# Patient Record
Sex: FEMALE
[De-identification: names, ages, dates, MRNs, and addresses within clinical notes are randomized per-mention and may not be internally consistent; named-entity substitution may affect disease eponyms.]

---

## 2024-07-17 ENCOUNTER — NON-APPOINTMENT (OUTPATIENT)
Age: 74
End: 2024-07-17

## 2024-07-24 ENCOUNTER — APPOINTMENT (OUTPATIENT)
Dept: ORTHOPEDIC SURGERY | Facility: CLINIC | Age: 74
End: 2024-07-24

## 2024-07-24 VITALS — BODY MASS INDEX: 20.76 KG/M2 | WEIGHT: 103 LBS | HEIGHT: 59 IN

## 2024-07-24 DIAGNOSIS — S62.109A FRACTURE OF UNSPECIFIED CARPAL BONE, UNSPECIFIED WRIST, INITIAL ENCOUNTER FOR CLOSED FRACTURE: ICD-10-CM

## 2024-07-24 DIAGNOSIS — Z87.891 PERSONAL HISTORY OF NICOTINE DEPENDENCE: ICD-10-CM

## 2024-07-24 DIAGNOSIS — Z78.9 OTHER SPECIFIED HEALTH STATUS: ICD-10-CM

## 2024-07-24 PROCEDURE — 99204 OFFICE O/P NEW MOD 45 MIN: CPT | Mod: 25

## 2024-07-24 PROCEDURE — 73110 X-RAY EXAM OF WRIST: CPT | Mod: LT

## 2024-07-24 PROCEDURE — 29125 APPL SHORT ARM SPLINT STATIC: CPT | Mod: LT

## 2024-07-24 RX ORDER — ALPRAZOLAM 2 MG/1
TABLET ORAL
Refills: 0 | Status: ACTIVE | COMMUNITY

## 2024-07-25 NOTE — HISTORY OF PRESENT ILLNESS
[de-identified] : Age: 73 year F PMHx: none Hand Dominance: RHD Chief Complaint: Patient presents for LT wrist pain evaluation with an injury on 7/18/24. Patient states she had a fall playing pickleball. Patient went to  urgent care right after and was told it is fractured and was splinted. Patient denies numbess/tingling. Patinet states she doesn't feel pain with the splint on. Trauma: yes,  7/18/24 Outside Imaging/Treatment: x-ray at  urgent care OTC Medications: Motrin, aspirin OT/PT: none Bracing: splint Pain worse with: Pain better with: ice

## 2024-07-25 NOTE — HISTORY OF PRESENT ILLNESS
[de-identified] : Age: 73 year F PMHx: none Hand Dominance: RHD Chief Complaint: Patient presents for LT wrist pain evaluation with an injury on 7/18/24. Patient states she had a fall playing pickleball. Patient went to  urgent care right after and was told it is fractured and was splinted. Patient denies numbess/tingling. Patinet states she doesn't feel pain with the splint on. Trauma: yes,  7/18/24 Outside Imaging/Treatment: x-ray at  urgent care OTC Medications: Motrin, aspirin OT/PT: none Bracing: splint Pain worse with: Pain better with: ice

## 2024-07-25 NOTE — ASSESSMENT
[FreeTextEntry1] : EXAM Left wrist with mild swelling, skin intact, no ecchymosis. +ttp at radial metaphysis, +ttp at radial styloid, Listers, or ulnar styloid. Able to make fist, oppose thumb to small finger and abduct fingers. Sensation intact throughout. <2sec cap refill.  Left wrist radiographs with distal radius fracture, minimally displaced, radial styloid - alignment maintained from radiographs from outside facility  ASSESSMENT/PLAN Left distal radius fracture, minimally displaced - will manage with closed management  Reviewed radiographs with patient and discussed pathoanatomy. Discussed alignment is within acceptable parameters to manage with closed management in brace. Discussed risk of stiffness, late tendon injury, and displacement requiring operative intervention. NWB, elevate, NSAIDs prn. Discussed the 10-15 degree of dorsal tilt and the accompanying wrist deformity - came to mutual decision to proceed with nonoperative management.  Procedure - left short arm plaster splint, reverse sugar tong applied, patient tolerated well.  F/u 2weeks; reassess, repeat films.

## 2024-08-09 ENCOUNTER — APPOINTMENT (OUTPATIENT)
Dept: ORTHOPEDIC SURGERY | Facility: CLINIC | Age: 74
End: 2024-08-09

## 2024-08-09 PROCEDURE — 73110 X-RAY EXAM OF WRIST: CPT | Mod: LT

## 2024-08-09 PROCEDURE — 99213 OFFICE O/P EST LOW 20 MIN: CPT

## 2024-08-09 NOTE — HISTORY OF PRESENT ILLNESS
[de-identified] : Age: 73 year F PMHx: none Hand Dominance: RHD Chief Complaint: Patient presents for LT wrist pain evaluation with an injury on 7/18/24. Patient states she had a fall playing pickleball. Patient went to  urgent care right after and was told it is fractured and was splinted. Patient denies numbess/tingling. Patinet states she doesn't feel pain with the splint on. Trauma: yes,  7/18/24 Outside Imaging/Treatment: x-ray at  urgent care OTC Medications: Motrin, aspirin OT/PT: none Bracing: splint Pain worse with: Pain better with: ice  08/09/24: f/u left wrist. Patient reports that she is doing well, reporting that she is able to lift her arms without any issues. Patient presents in short arm plaster splint and reports being compliant with keeping it dry and clean. Denies numbness/tingling.

## 2024-08-09 NOTE — ASSESSMENT
[FreeTextEntry1] : EXAM Left wrist with mild swelling, skin intact, no ecchymosis. +ttp at radial metaphysis, +ttp at radial styloid, Listers, or ulnar styloid. Able to make fist, oppose thumb to small finger and abduct fingers. Sensation intact throughout. <2sec cap refill.  Left wrist radiographs with distal radius fracture, minimally displaced, radial styloid - alignment maintained from radiographs from outside facility  ASSESSMENT/PLAN Left distal radius fracture, minimally displaced - will manage with closed management  Reviewed radiographs with patient and discussed pathoanatomy. Discussed alignment is within acceptable parameters to manage with closed management in brace. Discussed risk of stiffness, late tendon injury, and displacement requiring operative intervention. NWB, elevate, NSAIDs prn. Discussed the 10-15 degree of dorsal tilt and the accompanying wrist deformity - came to mutual decision to proceed with nonoperative management.  Procedure - left short arm plaster splint, reverse sugar tong applied, patient tolerated well.  F/u 2weeks; reassess, repeat films. convert to brace

## 2024-08-20 ENCOUNTER — APPOINTMENT (OUTPATIENT)
Dept: ORTHOPEDIC SURGERY | Facility: CLINIC | Age: 74
End: 2024-08-20
Payer: MEDICARE

## 2024-08-20 DIAGNOSIS — S62.109A FRACTURE OF UNSPECIFIED CARPAL BONE, UNSPECIFIED WRIST, INITIAL ENCOUNTER FOR CLOSED FRACTURE: ICD-10-CM

## 2024-08-20 PROCEDURE — 99213 OFFICE O/P EST LOW 20 MIN: CPT

## 2024-08-20 PROCEDURE — 73110 X-RAY EXAM OF WRIST: CPT | Mod: LT

## 2024-08-21 NOTE — HISTORY OF PRESENT ILLNESS
[de-identified] : Age: 73 year F PMHx: none Hand Dominance: RHD Chief Complaint: Patient presents for LT wrist pain evaluation with an injury on 7/18/24. Patient states she had a fall playing pickleball. Patient went to  urgent care right after and was told it is fractured and was splinted. Patient denies numbess/tingling. Patinet states she doesn't feel pain with the splint on. Trauma: yes,  7/18/24 Outside Imaging/Treatment: x-ray at  urgent care OTC Medications: Motrin, aspirin OT/PT: none Bracing: splint Pain worse with: Pain better with: ice  08/09/24: f/u left wrist. Patient reports that she is doing well, reporting that she is able to lift her arms without any issues. Patient presents in short arm plaster splint and reports being compliant with keeping it dry and clean. Denies numbness/tingling.   8/20/24: f/u left wrist, patient reports experiencing minimal intermittent pain, doing well overall.

## 2024-08-21 NOTE — HISTORY OF PRESENT ILLNESS
[de-identified] : Age: 73 year F PMHx: none Hand Dominance: RHD Chief Complaint: Patient presents for LT wrist pain evaluation with an injury on 7/18/24. Patient states she had a fall playing pickleball. Patient went to  urgent care right after and was told it is fractured and was splinted. Patient denies numbess/tingling. Patinet states she doesn't feel pain with the splint on. Trauma: yes,  7/18/24 Outside Imaging/Treatment: x-ray at  urgent care OTC Medications: Motrin, aspirin OT/PT: none Bracing: splint Pain worse with: Pain better with: ice  08/09/24: f/u left wrist. Patient reports that she is doing well, reporting that she is able to lift her arms without any issues. Patient presents in short arm plaster splint and reports being compliant with keeping it dry and clean. Denies numbness/tingling.   8/20/24: f/u left wrist, patient reports experiencing minimal intermittent pain, doing well overall.

## 2024-08-21 NOTE — ASSESSMENT
[FreeTextEntry1] : EXAM Left wrist with mild swelling, skin intact, no ecchymosis. Mild ttp at radial metaphysis, Mild ttp at radial styloid, Listers, or ulnar styloid. Able to make fist, oppose thumb to small finger and abduct fingers. Sensation intact throughout. <2sec cap refill.  Left wrist radiographs with distal radius fracture, minimally displaced, radial styloid, +callus, alignment maintained.  ASSESSMENT/PLAN Left distal radius fracture, minimally displaced - will manage with closed management  Reviewed radiographs with patient and discussed pathoanatomy. Discussed alignment is within acceptable parameters to manage with closed management in brace. Discussed risk of stiffness, late tendon injury, and displacement requiring operative intervention. NWB, elevate, NSAIDs prn. Discussed the 10-15 degree of dorsal tilt and the accompanying wrist deformity - came to mutual decision to proceed with nonoperative management.  Ease out of brqace and into use. OT for ROM  F/u 4weeks; reassess, repeat films

## 2024-09-27 ENCOUNTER — APPOINTMENT (OUTPATIENT)
Dept: ORTHOPEDIC SURGERY | Facility: CLINIC | Age: 74
End: 2024-09-27
Payer: MEDICARE

## 2024-09-27 VITALS — WEIGHT: 103 LBS | BODY MASS INDEX: 20.76 KG/M2 | HEIGHT: 59 IN

## 2024-09-27 DIAGNOSIS — S62.109A FRACTURE OF UNSPECIFIED CARPAL BONE, UNSPECIFIED WRIST, INITIAL ENCOUNTER FOR CLOSED FRACTURE: ICD-10-CM

## 2024-09-27 PROCEDURE — 73110 X-RAY EXAM OF WRIST: CPT | Mod: LT

## 2024-09-27 PROCEDURE — 99213 OFFICE O/P EST LOW 20 MIN: CPT

## 2024-09-27 NOTE — HISTORY OF PRESENT ILLNESS
[de-identified] : Age: 73 year F PMHx: none Hand Dominance: RHD Chief Complaint: Patient presents for LT wrist pain evaluation with an injury on 7/18/24. Patient states she had a fall playing pickleball. Patient went to  urgent care right after and was told it is fractured and was splinted. Patient denies numbess/tingling. Patinet states she doesn't feel pain with the splint on. Trauma: yes,  7/18/24 Outside Imaging/Treatment: x-ray at  urgent care OTC Medications: Motrin, aspirin OT/PT: none Bracing: splint Pain worse with: Pain better with: ice  08/09/24: f/u left wrist. Patient reports that she is doing well, reporting that she is able to lift her arms without any issues. Patient presents in short arm plaster splint and reports being compliant with keeping it dry and clean. Denies numbness/tingling.   8/20/24: f/u left wrist, patient reports experiencing minimal intermittent pain, doing well overall.   09/27/24: f/u left wrist. Patient reports that she is doing well. Patient reports that she is having no pain or discomfort at this time.  Patient currently attending OT 2 a week with relief, as well as HEP 2-3x a day. Denies numbness/tingling.

## 2024-09-27 NOTE — ASSESSMENT
[FreeTextEntry1] : EXAM Left wrist with mild swelling, skin intact, no ecchymosis. Mild ttp at radial metaphysis, Mild ttp at radial styloid, Listers, or ulnar styloid. Able to make fist, oppose thumb to small finger and abduct fingers. Sensation intact throughout. <2sec cap refill. Pro/sup to 80/80, flex/exte to 25/65.  Left wrist radiographs with distal radius fracture, minimally displaced, radial styloid, +callus, alignment maintained.  ASSESSMENT/PLAN Left distal radius fracture, minimally displaced - will continue to manage with closed management  Reviewed radiographs with patient and discussed pathoanatomy. Discussed alignment is within acceptable parameters to manage with closed management in brace. Discussed risk of stiffness, late tendon injury, and displacement requiring operative intervention. NWB, elevate, NSAIDs prn. Discussed the 10-15 degree of dorsal tilt and the accompanying wrist deformity - came to mutual decision to proceed with nonoperative management.  Ease into use  F/u 8weeks/prn; reassess, repeat films